# Patient Record
Sex: MALE | Race: ASIAN | NOT HISPANIC OR LATINO | URBAN - METROPOLITAN AREA
[De-identification: names, ages, dates, MRNs, and addresses within clinical notes are randomized per-mention and may not be internally consistent; named-entity substitution may affect disease eponyms.]

---

## 2018-03-23 ENCOUNTER — OFFICE VISIT - HEALTHEAST (OUTPATIENT)
Dept: FAMILY MEDICINE | Facility: CLINIC | Age: 23
End: 2018-03-23

## 2018-03-23 DIAGNOSIS — Z23 IMMUNIZATION DUE: ICD-10-CM

## 2018-03-23 DIAGNOSIS — Z11.1 SCREENING-PULMONARY TB: ICD-10-CM

## 2018-03-23 ASSESSMENT — MIFFLIN-ST. JEOR: SCORE: 1599.07

## 2018-03-26 ENCOUNTER — COMMUNICATION - HEALTHEAST (OUTPATIENT)
Dept: FAMILY MEDICINE | Facility: CLINIC | Age: 23
End: 2018-03-26

## 2018-03-26 LAB
QTF INTERPRETATION: NORMAL
QTF MITOGEN - NIL: >10 IU/ML
QTF NIL: 0.06 IU/ML
QTF RESULT: NEGATIVE
QTF TB ANTIGEN - NIL: -0.03 IU/ML

## 2019-05-22 ENCOUNTER — OFFICE VISIT - HEALTHEAST (OUTPATIENT)
Dept: FAMILY MEDICINE | Facility: CLINIC | Age: 24
End: 2019-05-22

## 2019-05-22 DIAGNOSIS — R09.82 POST-NASAL DRIP: ICD-10-CM

## 2019-05-22 DIAGNOSIS — J06.9 ACUTE URI: ICD-10-CM

## 2019-05-22 RX ORDER — LORATADINE 10 MG/1
10 TABLET ORAL DAILY
Qty: 30 TABLET | Refills: 0 | Status: SHIPPED | OUTPATIENT
Start: 2019-05-22

## 2021-05-29 NOTE — PROGRESS NOTES
Subjective:    Stanley Mclean is a 23 y.o. male who presents for evaluation of cough.  He has had a cough and some milder nasal congestion for 1 week.  He took some herbal medicines yesterday and is feeling a bit better today.  He has missed work for 2 days because of his coughing.  Cough seems to be worst first thing in the morning.  He is coughing up phlegm.  No hemoptysis.  No fevers.  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  However, he was evaluated at our clinic and health department and QuantiFERON gold test was negative.      Current Outpatient Medications:      loratadine (CLARITIN) 10 mg tablet, Take 1 tablet (10 mg total) by mouth daily., Disp: 30 tablet, Rfl: 0     Objective:   Allergies:  Patient has no known allergies.    Vitals:  Vitals:    05/22/19 1452   BP: 102/72   Patient Site: Left Arm   Patient Position: Sitting   Cuff Size: Adult Regular   Pulse: 80   Resp: 16   Temp: 97.3  F (36.3  C)   TempSrc: Oral   SpO2: 98%   Weight: 154 lb 12 oz (70.2 kg)     Body mass index is 24.98 kg/m .    Vital signs reviewed.  General: Patient is alert and oriented x 3, in no apparent distress  Ears: TMs are non-erythematous with good light reflex bilaterally  Throat: no erythema, edema or exudate noted  Lymphatic: no anterior cervical lymph node enlargement  Cardiac: regular rate and rhythm, no murmurs  Pulmonary: lungs clear to auscultation bilaterally, no crackles, rales, rhonchi, or wheezing noted    Results for orders placed or performed in visit on 03/23/18   QTF-Mycobacterium tuberculosis by QuantiFERON-TB Gold   Result Value Ref Range    QTF RESULT Negative Negative    QTF INTREPRETATION       No interferon-gamma response to M. tuberculosis antigens was detected.  Infecton with M. tuberculosis is unlikely.  A negative result alone does not exclude infection with M. tuberculosis    QTF NIL 0.06 IU/mL    QTF TB ANTIGEN - NIL -0.03 IU/mL    QTF MITOGEN - NIL >10.00  IU/mL       Assessment and Plan:   1.  Acute URI, likely viral.  I reviewed continued symptomatic treatment, including fluids, cough drops.  Prescription sent for Claritin to try for symptomatic relief.  No red flags on exam or history.  I anticipate symptoms will resolve within the next 1 to 2 weeks.  If not, he should return for further follow-up.  I also reviewed with him symptoms should not be worsening.  So if cough gets worse or other new or unusual symptoms start, he will let us know.  This dictation uses voice recognition software, which may contain typographical errors.

## 2021-06-01 VITALS — WEIGHT: 148 LBS | HEIGHT: 66 IN | BODY MASS INDEX: 23.78 KG/M2

## 2021-06-03 VITALS — WEIGHT: 154.75 LBS | BODY MASS INDEX: 24.98 KG/M2

## 2021-06-16 NOTE — PROGRESS NOTES
Exp tb  Multidrug resistant tb  Father  Pt denies sxs  ROS: as noted above    OBJECTIVE:   Vitals:    03/23/18 1607   BP: 110/62   Pulse: (!) 56   Resp: 20      Wt is noted.  No diaphoresis  Eyes: nl eom, anicteric   External ears, nose: nl    Neck: nl nodes, supple, thyroid normal   Lungs: clear to ausc   Heart: regular rhythm  Abd: soft nontender   No cva (renal) tenderness  Neuro: no weakness  Skin no rash  Joints: uninflamed   No ketotic breath odor noted  Mental: euthymic  Ext: nontender calves   Gait: normal    Bmi:23  ASSESSMENT/PLAN:    1. Screening-pulmonary TB  QTF-Mycobacterium tuberculosis by QuantiFERON-TB Gold     More than 10 of fifteen total minutes time spent education counseling regarding the issues and care of same as listed in the assessment and plan of this note

## 2021-06-19 NOTE — LETTER
Letter by Sissy Rahman PA-C at      Author: Sissy Rahman PA-C Service: -- Author Type: --    Filed:  Encounter Date: 5/22/2019 Status: (Other)         May 22, 2019     Patient: Stanley Mclean   YOB: 1995   Date of Visit: 5/22/2019       To Whom it May Concern:    Stanley Mclean was seen in my clinic on 5/22/2019.  Please excuse him from work on 5/21/19 and 5/22/19.    If you have any questions or concerns, please don't hesitate to call.    Sincerely,         Electronically signed by Sissy Rahman PA-C

## 2021-12-23 ENCOUNTER — TELEPHONE (OUTPATIENT)
Dept: FAMILY MEDICINE | Facility: CLINIC | Age: 26
End: 2021-12-23

## 2021-12-23 DIAGNOSIS — Z20.1 TUBERCULOSIS EXPOSURE: Primary | ICD-10-CM

## 2021-12-27 NOTE — TELEPHONE ENCOUNTER
FYI   
[facility] called, this patient is a MDR TB contact patient.   They are reaching out to providers to notify that patient is a MDR TB contact patient. Patient is refusing to be seen and treated at the TB Clinic and would like to follow up with PCP. Please reach out to patient to follow up on TB contact with provider.  If there are any questions, please call [facility], Danielle Jay at [phone] or [place] at [phone].    *Patient was last saw Sissy Rahman on 05/2019.  
Please schedule for a quantiferon gold lab test now and video visit in 2 weeks or so.    Stanley was seen today for mdr-tb contact.    Diagnoses and all orders for this visit:    Tuberculosis exposure  -     Quantiferon-TB Gold Plus; Future        
Spoke to pt and pt refuse to get done. Per pt, he tested back in 2017 and was negative. Pt feels he doesn't need to get test done again. Pt say his brother is still in the hospital at the moment and waiting pending TB test on his brother. Pt will decide to schedule or not when his brother test come back. I advise pt that it will also be best to test himself instead of waiting for is brother's results.  Pt insist on not coming in and say he will call back to schedule himself. Completing task.   
Patent

## 2023-11-09 ENCOUNTER — TELEPHONE (OUTPATIENT)
Dept: FAMILY MEDICINE | Facility: CLINIC | Age: 28
End: 2023-11-09

## 2023-11-09 NOTE — TELEPHONE ENCOUNTER
Forms/Letter Request    Type of form/letter: FMLA - Unknown    Have you been seen for this request: No    Do we have the form/letter: Yes    Who is the form from? Patient    Where did/will the form come from? Patient or family brought in       When is form/letter needed by: 11/16/2023    How would you like the form/letter returned: Fax : [phone] , patient will  a copy as well     Patient Notified form requests are processed in 3-5 business days:Yes    Okay to leave a detailed message?: Yes at Other phone number: [phone], spouse

## 2023-11-17 NOTE — TELEPHONE ENCOUNTER
Forms completed and faxed to fax number [phone]. Pt's wife informed and will  later today. Copy sent to urgent scan

## 2023-11-20 NOTE — TELEPHONE ENCOUNTER
Spoke to spouse. Spouse would like forms to mailed to home address. No further action, completing task.